# Patient Record
Sex: FEMALE | Race: WHITE | NOT HISPANIC OR LATINO | Employment: FULL TIME | URBAN - NONMETROPOLITAN AREA
[De-identification: names, ages, dates, MRNs, and addresses within clinical notes are randomized per-mention and may not be internally consistent; named-entity substitution may affect disease eponyms.]

---

## 2023-01-09 ENCOUNTER — APPOINTMENT (EMERGENCY)
Dept: RADIOLOGY | Facility: HOSPITAL | Age: 23
End: 2023-01-09

## 2023-01-09 ENCOUNTER — HOSPITAL ENCOUNTER (EMERGENCY)
Facility: HOSPITAL | Age: 23
Discharge: HOME | End: 2023-01-09
Attending: EMERGENCY MEDICINE

## 2023-01-09 VITALS
SYSTOLIC BLOOD PRESSURE: 111 MMHG | RESPIRATION RATE: 20 BRPM | WEIGHT: 123.68 LBS | TEMPERATURE: 97.6 F | HEART RATE: 68 BPM | DIASTOLIC BLOOD PRESSURE: 67 MMHG | OXYGEN SATURATION: 99 % | BODY MASS INDEX: 22.76 KG/M2 | HEIGHT: 62 IN

## 2023-01-09 DIAGNOSIS — R07.89 CHEST WALL PAIN: Primary | ICD-10-CM

## 2023-01-09 RX ORDER — ACETAMINOPHEN 325 MG/1
650 TABLET ORAL ONCE
Status: COMPLETED | OUTPATIENT
Start: 2023-01-09 | End: 2023-01-09

## 2023-01-09 RX ORDER — LIDOCAINE 50 MG/G
1 PATCH TOPICAL ONCE
Status: DISCONTINUED | OUTPATIENT
Start: 2023-01-09 | End: 2023-01-09 | Stop reason: HOSPADM

## 2023-01-09 RX ORDER — BENZONATATE 100 MG/1
100 CAPSULE ORAL ONCE
Status: COMPLETED | OUTPATIENT
Start: 2023-01-09 | End: 2023-01-09

## 2023-01-09 RX ADMIN — ACETAMINOPHEN 650 MG: 325 TABLET ORAL at 17:49

## 2023-01-09 RX ADMIN — BENZONATATE 100 MG: 100 CAPSULE ORAL at 17:49

## 2023-01-09 RX ADMIN — LIDOCAINE 1 PATCH: 50 PATCH TOPICAL at 17:49

## 2023-01-09 NOTE — ED PROVIDER NOTES
History  Chief Complaint   Patient presents with   • Pain With Breathing     Pt reports productive cough for the last week, pt reports right sided rib pain with coughing/sneezing/inhalation starting one day ago      HPI  22F presenting with cough and chest pain  For the past week, patient has been having coughing  2 days ago, patient began having pain over right lower ribs  Pain described as sharp sensation that is constant, but worse if she coughs, sneezes, breathes deeply, or moves  +rhinorrhea, congestion  She was seen at urgent care on 1/7, diagnosed with sinusitis and placed on Augmentin  She took 3x Advil today  Denies fevers, shortness of breath  Currently menstruating  History reviewed  No pertinent past medical history  History reviewed  No pertinent surgical history  History reviewed  No pertinent family history  I have reviewed and agree with the history as documented  E-Cigarette/Vaping   • E-Cigarette Use Never User      E-Cigarette/Vaping Substances     Social History     Tobacco Use   • Smoking status: Never   • Smokeless tobacco: Never   Vaping Use   • Vaping Use: Never used   Substance Use Topics   • Alcohol use: Not Currently   • Drug use: Not Currently       Review of Systems   Constitutional: Negative for chills and fever  HENT: Positive for congestion, rhinorrhea and sneezing  Negative for ear pain and sore throat  Eyes: Negative for pain and visual disturbance  Respiratory: Negative for cough and shortness of breath  Cardiovascular: Positive for chest pain  Negative for palpitations  Gastrointestinal: Negative for abdominal pain and vomiting  Genitourinary: Negative for dysuria and hematuria  Musculoskeletal: Negative for arthralgias and back pain  Skin: Negative for color change and rash  Neurological: Negative for seizures and syncope  All other systems reviewed and are negative  Physical Exam  Physical Exam  Vitals and nursing note reviewed  Constitutional:       General: She is not in acute distress  Appearance: She is well-developed  HENT:      Head: Normocephalic and atraumatic  Right Ear: External ear normal       Left Ear: External ear normal       Nose: Nose normal    Eyes:      Conjunctiva/sclera: Conjunctivae normal    Cardiovascular:      Rate and Rhythm: Normal rate and regular rhythm  Pulmonary:      Effort: Pulmonary effort is normal  No respiratory distress  Breath sounds: Normal breath sounds  Abdominal:      Palpations: Abdomen is soft  Tenderness: There is no abdominal tenderness  Musculoskeletal:         General: Tenderness present  Cervical back: Normal range of motion and neck supple  Comments: Point tenderness over right rib, approx rib #5  Skin:     General: Skin is warm and dry  Neurological:      General: No focal deficit present  Mental Status: She is alert  Mental status is at baseline  Vital Signs  ED Triage Vitals [01/09/23 1728]   Temperature Pulse Respirations Blood Pressure SpO2   97 6 °F (36 4 °C) 68 20 111/67 99 %      Temp Source Heart Rate Source Patient Position - Orthostatic VS BP Location FiO2 (%)   Temporal Monitor Sitting Left arm --      Pain Score       9           Vitals:    01/09/23 1728   BP: 111/67   Pulse: 68   Patient Position - Orthostatic VS: Sitting         Visual Acuity      ED Medications  Medications   lidocaine (LIDODERM) 5 % patch 1 patch (1 patch Topical Medication Applied 1/9/23 1749)   acetaminophen (TYLENOL) tablet 650 mg (650 mg Oral Given 1/9/23 1749)   benzonatate (TESSALON PERLES) capsule 100 mg (100 mg Oral Given 1/9/23 1749)       Diagnostic Studies  Results Reviewed     None             XR chest 2 views   ED Interpretation by Alicia Matamoros MD (01/09 1758)   No acute cardiopulm abnl             Procedures  Procedures       ED Course     Medical Decision Making  22F presenting with URI symptoms and right chest wall pain   Point tenderness over right 5th rib  No ecchymoses, rash, or erythema over the region  PERC negative  Suspect MSK strain from coughing  CXR per my independent review and interpretation negative for acute cardiopulm abnl - lungs clear, no pneumothorax  Patient provided medications for pain and cough  Advised OTC medications for pain management  Discharged in stable condition  Chest wall pain: acute illness or injury  Amount and/or Complexity of Data Reviewed  Radiology: ordered and independent interpretation performed  Risk  OTC drugs  Prescription drug management  Disposition  Final diagnoses:   Chest wall pain     Time reflects when diagnosis was documented in both MDM as applicable and the Disposition within this note     Time User Action Codes Description Comment    1/9/2023  6:34 PM Ritchie Face Add [R07 89] Chest wall pain       ED Disposition     ED Disposition   Discharge    Condition   Stable    Date/Time   Mon Jan 9, 2023  6:34 PM    Comment   Karli Arriaga discharge to home/self care  Follow-up Information     Follow up With Specialties Details Why Mariposa Parker MD Family Medicine Go to  As needed [facility]  [phone]            There are no discharge medications for this patient  No discharge procedures on file      PDMP Review     None          ED Provider  Electronically Signed by           Tiffani Roberts MD  01/09/23 9987

## 2023-01-09 NOTE — DISCHARGE INSTRUCTIONS
Your imaging studies have been preliminarily reviewed by the emergency department  Further review by Radiology is pending at this time  If there is a discrepancy or a finding of additional concern identified, we will attempt to contact you at the number you have provided us  Your results may also be available on [facility]     Use Tylenol and/or ibuprofen and lidocaine patches for your pain  Follow-up with your PCP as needed

## 2023-01-09 NOTE — ED NOTES
Pt seen at urgent care on Saturday and told they had sinus infection, started on Amoxicillin and an allergy pill at that time        German Franklin RN  01/09/23 0093